# Patient Record
Sex: FEMALE | Race: ASIAN
[De-identification: names, ages, dates, MRNs, and addresses within clinical notes are randomized per-mention and may not be internally consistent; named-entity substitution may affect disease eponyms.]

---

## 2023-02-16 PROBLEM — Z00.00 ENCOUNTER FOR PREVENTIVE HEALTH EXAMINATION: Status: ACTIVE | Noted: 2023-02-16

## 2023-02-21 ENCOUNTER — APPOINTMENT (OUTPATIENT)
Dept: PEDIATRIC ALLERGY IMMUNOLOGY | Facility: CLINIC | Age: 33
End: 2023-02-21
Payer: COMMERCIAL

## 2023-02-21 VITALS
BODY MASS INDEX: 22.03 KG/M2 | HEIGHT: 62.2 IN | WEIGHT: 121.25 LBS | TEMPERATURE: 97.1 F | SYSTOLIC BLOOD PRESSURE: 120 MMHG | DIASTOLIC BLOOD PRESSURE: 80 MMHG

## 2023-02-21 PROCEDURE — 99204 OFFICE O/P NEW MOD 45 MIN: CPT | Mod: 25

## 2023-02-21 PROCEDURE — 94060 EVALUATION OF WHEEZING: CPT

## 2023-02-21 RX ORDER — METHSCOPOLAMINE BROMIDE 2.5 MG/1
2.5 TABLET ORAL
Qty: 120 | Refills: 0 | Status: ACTIVE | COMMUNITY
Start: 2023-02-13

## 2023-02-21 RX ORDER — KETOTIFEN FUMARATE 0.25 MG/ML
0.03 SOLUTION/ DROPS OPHTHALMIC
Qty: 5 | Refills: 0 | Status: ACTIVE | COMMUNITY
Start: 2023-02-17

## 2023-02-21 RX ORDER — DICYCLOMINE HYDROCHLORIDE 20 MG/1
20 TABLET ORAL
Qty: 42 | Refills: 0 | Status: ACTIVE | COMMUNITY
Start: 2022-08-31

## 2023-02-21 RX ORDER — MONTELUKAST 10 MG/1
10 TABLET, FILM COATED ORAL
Qty: 30 | Refills: 0 | Status: ACTIVE | COMMUNITY
Start: 2022-11-15

## 2023-02-21 RX ORDER — MIRTAZAPINE 7.5 MG/1
7.5 TABLET, FILM COATED ORAL
Qty: 30 | Refills: 0 | Status: ACTIVE | COMMUNITY
Start: 2022-11-21

## 2023-02-21 RX ORDER — NEOMYCIN AND POLYMYXIN B SULFATES AND DEXAMETHASONE 3.5; 10000; 1 MG/G; [IU]/G; MG/G
3.5-10000-0.1 OINTMENT OPHTHALMIC
Qty: 3 | Refills: 0 | Status: ACTIVE | COMMUNITY
Start: 2022-11-21

## 2023-02-21 RX ORDER — CARBOXYMETHYLCELLULOSE SODIUM 5 MG/ML
0.5 SOLUTION/ DROPS OPHTHALMIC
Qty: 70 | Refills: 0 | Status: ACTIVE | COMMUNITY
Start: 2023-02-17

## 2023-02-21 RX ORDER — BUDESONIDE AND FORMOTEROL FUMARATE DIHYDRATE 160; 4.5 UG/1; UG/1
160-4.5 AEROSOL RESPIRATORY (INHALATION)
Qty: 10 | Refills: 0 | Status: ACTIVE | COMMUNITY
Start: 2023-01-30

## 2023-02-21 NOTE — HISTORY OF PRESENT ILLNESS
[de-identified] : JESSICA BONDS is a 32 year yo female who has had allergies a long time. Since she got a cat, her asthma has flared up. She has had worsening symptoms since 5 years ago since getting a cat. In the past  6 months, she has been on Singulair and Loratadine.  She states she was better controlled with her previous medicine, but since taking Loratadine she does not feel her allergies are as controlled and when she was on lewis and singulair .\par \par She is currently on Symbicort 160/4.5  She is using her albuterol about 3x a week. \par \par .

## 2023-02-21 NOTE — REVIEW OF SYSTEMS
[Eye Itching] : itchy eyes [Dry Eyes] : dryness ~T of the eyes [Rhinorrhea] : rhinorrhea [Nasal Congestion] : nasal congestion [Nasal Itching] : nasal itching [Throat Itching] : throat itching [Post Nasal Drip] : post nasal drip [Sneezing] : sneezing [Cough] : cough [Wheezing] : wheezing [Atopic Dermatitis] : atopic dermatitis [Nl] : Genitourinary [de-identified] : and neck

## 2023-02-21 NOTE — ASSESSMENT
[FreeTextEntry1] : 1. AS - Symbicort 160/4.5, Montelukast 10mg Albuterol prn\par \par 2. AR/AC - Fluticasone nasal, will add loratadine or cetirizine later - Methylprednisolone taper - SPT to 60 ENV ? ID to start IT \par \par 3. AD - Moisturuze

## 2023-02-21 NOTE — SOCIAL HISTORY
[Apartment] : [unfilled] lives in an apartment  [Central Forced Air] : heating provided by central forced air [Central] : air conditioning provided by central unit [Cat] : cat [Humidifier] : does not use a humidifier [Dehumidifier] : does not use a dehumidifier [Dust Mite Covers] : does not have dust mite covers [Feather Pillows] : does not have feather pillows [Feather Comforter] : does not have a feather comforter [Bedroom] : not in the bedroom [Basement] : not in the basement [Living Area] : not in the living area [Smokers in Household] : there are no smokers in the home

## 2023-02-28 ENCOUNTER — APPOINTMENT (OUTPATIENT)
Dept: PEDIATRIC ALLERGY IMMUNOLOGY | Facility: CLINIC | Age: 33
End: 2023-02-28
Payer: COMMERCIAL

## 2023-02-28 VITALS
SYSTOLIC BLOOD PRESSURE: 120 MMHG | WEIGHT: 121 LBS | HEIGHT: 62 IN | BODY MASS INDEX: 22.26 KG/M2 | DIASTOLIC BLOOD PRESSURE: 80 MMHG | TEMPERATURE: 97.1 F

## 2023-02-28 PROCEDURE — 99214 OFFICE O/P EST MOD 30 MIN: CPT | Mod: 25

## 2023-02-28 PROCEDURE — 94060 EVALUATION OF WHEEZING: CPT

## 2023-02-28 NOTE — HISTORY OF PRESENT ILLNESS
[de-identified] : JESSICA BONDS is a 32 year yo female who has had allergies a long time. Since she got a cat, her asthma has flared up. She has had worsening symptoms since 5 years ago since getting a cat. In the past 6 months, she has been on Singulair and Loratadine. She states she was better controlled with her previous medicine, but since taking Loratadine she does not feel her allergies are as controlled and when she was on lewis and singulair.\par \par She is currently on Symbicort 160/4.5 She is using her albuterol about 3x a week. \par \par \par \par She is today to follow up she states she is doing better no new issues the medications has helped she states that she is still feeling some tightness but is unsure if it is psychological or not.

## 2023-02-28 NOTE — ASSESSMENT
[FreeTextEntry1] : 1. AS - Hold Symbicort 160/4.5, Montelukast 10mg Albuterol prn - Trial breo ellipta 200 - Repeat PFT after internal medicine rotation\par \par 2. AR/AC - Fluticasone nasal,  loratadine or cetirizine - SPT to 60 ENV ? ID to start IT \par \par 3. AD - Moisturuze

## 2023-02-28 NOTE — PHYSICAL EXAM
[Alert] : alert [Well Nourished] : well nourished [Healthy Appearance] : healthy appearance [No Acute Distress] : no acute distress [Well Developed] : well developed [Normal Pupil & Iris Size/Symmetry] : normal pupil and iris size and symmetry [No Discharge] : no discharge [No Photophobia] : no photophobia [Sclera Not Icteric] : sclera not icteric [Normal TMs] : both tympanic membranes were normal [Normal Nasal Mucosa] : the nasal mucosa was normal [Normal Lips/Tongue] : the lips and tongue were normal [Normal Outer Ear/Nose] : the ears and nose were normal in appearance [Normal Tonsils] : normal tonsils [No Thrush] : no thrush [Pale mucosa] : pale mucosa [Supple] : the neck was supple [Normal Rate and Effort] : normal respiratory rhythm and effort [No Crackles] : no crackles [No Retractions] : no retractions [Bilateral Audible Breath Sounds] : bilateral audible breath sounds [Normal Rate] : heart rate was normal  [Normal S1, S2] : normal S1 and S2 [No murmur] : no murmur [Regular Rhythm] : with a regular rhythm [Skin Intact] : skin intact  [No Rash] : no rash [No Skin Lesions] : no skin lesions [No Edema] : no edema [Normal Mood] : mood was normal [Normal Affect] : affect was normal [Alert, Awake, Oriented as Age-Appropriate] : alert, awake, oriented as age appropriate

## 2023-03-07 RX ORDER — METHYLPREDNISOLONE 4 MG/1
4 TABLET ORAL
Qty: 1 | Refills: 0 | Status: ACTIVE | COMMUNITY
Start: 2023-02-21 | End: 1900-01-01

## 2023-03-07 RX ORDER — FLUTICASONE FUROATE AND VILANTEROL TRIFENATATE 200; 25 UG/1; UG/1
200-25 POWDER RESPIRATORY (INHALATION) DAILY
Qty: 1 | Refills: 1 | Status: ACTIVE | COMMUNITY
Start: 2023-02-28 | End: 1900-01-01

## 2023-03-12 ENCOUNTER — TRANSCRIPTION ENCOUNTER (OUTPATIENT)
Age: 33
End: 2023-03-12

## 2023-05-02 ENCOUNTER — APPOINTMENT (OUTPATIENT)
Dept: PEDIATRIC ALLERGY IMMUNOLOGY | Facility: CLINIC | Age: 33
End: 2023-05-02

## 2023-05-16 ENCOUNTER — APPOINTMENT (OUTPATIENT)
Dept: PEDIATRIC ALLERGY IMMUNOLOGY | Facility: CLINIC | Age: 33
End: 2023-05-16
Payer: COMMERCIAL

## 2023-05-16 VITALS
BODY MASS INDEX: 22.27 KG/M2 | SYSTOLIC BLOOD PRESSURE: 120 MMHG | HEIGHT: 62.99 IN | TEMPERATURE: 97.1 F | WEIGHT: 125.66 LBS | DIASTOLIC BLOOD PRESSURE: 80 MMHG

## 2023-05-16 PROCEDURE — 99214 OFFICE O/P EST MOD 30 MIN: CPT

## 2023-05-16 NOTE — HISTORY OF PRESENT ILLNESS
[de-identified] : JESSICA BONDS is a 32 year yo female who has had allergies a long time. Since she got a cat, her asthma has flared up. She has had worsening symptoms since 5 years ago since getting a cat. In the past 6 months, she has been on Singulair and Loratadine. She states she was better controlled with her previous medicine, but since taking Loratadine she does not feel her allergies are as controlled and when she was on lewis and singulair.\par \par \par She is here today for a follow up she is doing better she states the medications has helped here no new or worsening signs or symptoms \par  \par

## 2023-05-16 NOTE — ASSESSMENT
[FreeTextEntry1] : 1. AS - Breo Ellipta 200, Albuterol prn  - Repeat PFT \par \par 2. AR/AC - Fluticasone nasal,,- SPT to 60 ENV ? ID to start IT \par \par 3. AD - Moisturize

## 2023-07-10 ENCOUNTER — APPOINTMENT (OUTPATIENT)
Dept: PEDIATRIC ALLERGY IMMUNOLOGY | Facility: CLINIC | Age: 33
End: 2023-07-10
Payer: COMMERCIAL

## 2023-07-10 VITALS
SYSTOLIC BLOOD PRESSURE: 120 MMHG | TEMPERATURE: 97 F | BODY MASS INDEX: 22.15 KG/M2 | HEIGHT: 63 IN | WEIGHT: 125 LBS | DIASTOLIC BLOOD PRESSURE: 80 MMHG

## 2023-07-10 DIAGNOSIS — L20.9 ATOPIC DERMATITIS, UNSPECIFIED: ICD-10-CM

## 2023-07-10 DIAGNOSIS — H10.13 ACUTE ATOPIC CONJUNCTIVITIS, BILATERAL: ICD-10-CM

## 2023-07-10 PROCEDURE — 99213 OFFICE O/P EST LOW 20 MIN: CPT

## 2023-07-10 NOTE — ASSESSMENT
[FreeTextEntry1] : 1. AS - Symbicort 160/4.5, Albuterol prn\par \par 2. AR/AC - Fluticasone nasal,,- SPT to 60 ENV ? ID to start IT \par \par 3. AD - Moisturize

## 2023-07-10 NOTE — HISTORY OF PRESENT ILLNESS
[de-identified] : JESSICA BONDS is a 32 year yo female who has had allergies a long time. Since she got a cat, her asthma has flared up. She has had worsening symptoms since 5 years ago since getting a cat. In the past 6 months, she has been on Singulair and Loratadine. She states she was better controlled with her previous medicine, but since taking Loratadine she does not feel her allergies are as controlled and when she was on lewis and singulair.\par \par \par She is here today for a follow up she is doing better she states the medications has helped here no new or worsening signs or symptoms\par \par \par She is here today for a follow up  no new changes no new or worsening signs or symptoms she states she is doing well but she would like to discuss issues with her inhaler she states every time she uses the inhaler she gets throat infections she states it has happened 3 times she states it takes time to appear but she gets them  she was put on antibiotics all 3 times\par  \par

## 2023-09-11 ENCOUNTER — APPOINTMENT (OUTPATIENT)
Dept: PEDIATRIC ALLERGY IMMUNOLOGY | Facility: CLINIC | Age: 33
End: 2023-09-11
Payer: COMMERCIAL

## 2023-09-11 VITALS — BODY MASS INDEX: 22.15 KG/M2 | WEIGHT: 125 LBS | HEIGHT: 63 IN

## 2023-09-11 PROCEDURE — 99214 OFFICE O/P EST MOD 30 MIN: CPT

## 2023-09-11 RX ORDER — FLUTICASONE PROPIONATE 50 UG/1
50 SPRAY, METERED NASAL DAILY
Qty: 1 | Refills: 2 | Status: ACTIVE | COMMUNITY
Start: 2023-02-21 | End: 1900-01-01

## 2023-09-12 ENCOUNTER — APPOINTMENT (OUTPATIENT)
Dept: PEDIATRIC ALLERGY IMMUNOLOGY | Facility: CLINIC | Age: 33
End: 2023-09-12

## 2023-10-24 ENCOUNTER — APPOINTMENT (OUTPATIENT)
Dept: PEDIATRIC ALLERGY IMMUNOLOGY | Facility: CLINIC | Age: 33
End: 2023-10-24

## 2024-01-23 RX ORDER — BUDESONIDE AND FORMOTEROL FUMARATE DIHYDRATE 160; 4.5 UG/1; UG/1
160-4.5 AEROSOL RESPIRATORY (INHALATION) TWICE DAILY
Qty: 1 | Refills: 2 | Status: ACTIVE | COMMUNITY
Start: 2023-07-10 | End: 1900-01-01

## 2024-01-29 ENCOUNTER — APPOINTMENT (OUTPATIENT)
Dept: PEDIATRIC ALLERGY IMMUNOLOGY | Facility: CLINIC | Age: 34
End: 2024-01-29
Payer: COMMERCIAL

## 2024-01-29 VITALS — WEIGHT: 125 LBS | BODY MASS INDEX: 22.15 KG/M2 | HEIGHT: 63 IN

## 2024-01-29 DIAGNOSIS — J45.40 MODERATE PERSISTENT ASTHMA, UNCOMPLICATED: ICD-10-CM

## 2024-01-29 DIAGNOSIS — J30.81 ALLERGIC RHINITIS DUE TO ANIMAL (CAT) (DOG) HAIR AND DANDER: ICD-10-CM

## 2024-01-29 PROCEDURE — 99213 OFFICE O/P EST LOW 20 MIN: CPT

## 2024-01-29 NOTE — ASSESSMENT
[FreeTextEntry1] : The patient is a 33 year old female with a history of allergic rhinitis and bronchial asthma. Spirometry done shows reversible small airway obstruction. She will continue on Fluticasone Nasal Spray and Symbicort 160/4.5 Mcg. She will return in 2-3 months or as needed.

## 2024-01-29 NOTE — HISTORY OF PRESENT ILLNESS
[de-identified] : JESSICA BONDS is a 33 year old female who has had allergies a long time. Since she got a cat, her asthma has flared up. She has had worsening symptoms since 5 years ago since getting a cat. In the past 6 months, she has been on Singulair and Loratadine. She states she was better controlled with her previous medicine, but since taking Loratadine she does not feel her allergies are as controlled.  She is here today for a follow up she is doing better she states the medications has helped here no new or worsening signs or symptoms

## 2024-01-29 NOTE — ASSESSMENT
[FreeTextEntry1] : The patient is a 33 year old female with a history of allergic rhinitis and bronchial asthma. I have told her to continue medications as instructed and to see Dr. Davey Parikh at [facility] for further treatment.

## 2024-01-29 NOTE — HISTORY OF PRESENT ILLNESS
[None] : The patient is currently asymptomatic [de-identified] : JESSICA BONDS is a 33 year old female who has had allergies a long time. Since she got a cat, her asthma has flared up. She has had worsening symptoms since 5 years ago since getting a cat. In the past 6 months, she has been on Singulair and Loratadine. She states she was better controlled with her previous medicine, but since taking Loratadine she does not feel her allergies are as controlled.  She is here today for a follow up she is doing better she states the medications has helped here no new or worsening signs or symptoms. She has recently acquired a cat and does not want to get rid of it but is symptomatic around the cat.